# Patient Record
Sex: MALE | Race: WHITE | NOT HISPANIC OR LATINO | URBAN - METROPOLITAN AREA
[De-identification: names, ages, dates, MRNs, and addresses within clinical notes are randomized per-mention and may not be internally consistent; named-entity substitution may affect disease eponyms.]

---

## 2017-05-11 ENCOUNTER — EMERGENCY (EMERGENCY)
Facility: HOSPITAL | Age: 57
LOS: 0 days | Discharge: ANOTHER HOSPITAL | End: 2017-05-11
Attending: EMERGENCY MEDICINE | Admitting: EMERGENCY MEDICINE
Payer: OTHER MISCELLANEOUS

## 2017-05-11 VITALS
TEMPERATURE: 98 F | HEART RATE: 90 BPM | RESPIRATION RATE: 18 BRPM | SYSTOLIC BLOOD PRESSURE: 162 MMHG | OXYGEN SATURATION: 97 % | DIASTOLIC BLOOD PRESSURE: 98 MMHG

## 2017-05-11 VITALS
HEART RATE: 76 BPM | TEMPERATURE: 98 F | OXYGEN SATURATION: 100 % | DIASTOLIC BLOOD PRESSURE: 100 MMHG | RESPIRATION RATE: 17 BRPM | SYSTOLIC BLOOD PRESSURE: 168 MMHG
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
## 2017-05-11 DIAGNOSIS — T23.061A BURN OF UNSPECIFIED DEGREE OF BACK OF RIGHT HAND, INITIAL ENCOUNTER: ICD-10-CM

## 2017-05-11 DIAGNOSIS — T23.062A BURN OF UNSPECIFIED DEGREE OF BACK OF LEFT HAND, INITIAL ENCOUNTER: ICD-10-CM

## 2017-05-11 DIAGNOSIS — T23.069A: ICD-10-CM
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
## 2017-05-11 PROCEDURE — 99284 EMERGENCY DEPT VISIT MOD MDM: CPT

## 2017-05-11 RX ORDER — MORPHINE SULFATE 50 MG/1
4 CAPSULE, EXTENDED RELEASE ORAL ONCE
Qty: 0 | Refills: 0 | Status: DISCONTINUED | OUTPATIENT
Start: 2017-05-11 | End: 2017-05-11

## 2017-05-11 RX ORDER — SODIUM CHLORIDE 9 MG/ML
1000 INJECTION INTRAMUSCULAR; INTRAVENOUS; SUBCUTANEOUS ONCE
Qty: 0 | Refills: 0 | Status: COMPLETED | OUTPATIENT
Start: 2017-05-11 | End: 2017-05-11

## 2017-05-11 RX ADMIN — SODIUM CHLORIDE 1000 MILLILITER(S): 9 INJECTION INTRAMUSCULAR; INTRAVENOUS; SUBCUTANEOUS at 16:02

## 2017-05-11 RX ADMIN — MORPHINE SULFATE 4 MILLIGRAM(S): 50 CAPSULE, EXTENDED RELEASE ORAL at 16:12

## 2017-05-11 RX ADMIN — MORPHINE SULFATE 4 MILLIGRAM(S): 50 CAPSULE, EXTENDED RELEASE ORAL at 16:30

## 2017-05-11 NOTE — ED PROVIDER NOTE - NS ED MD SCRIBE ATTENDING SCRIBE SECTIONS
RESULTS/DISPOSITION/HISTORY OF PRESENT ILLNESS/PROGRESS NOTE/PHYSICAL EXAM/VITAL SIGNS( Pullset)/PAST MEDICAL/SURGICAL/SOCIAL HISTORY/REVIEW OF SYSTEMS
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
## 2017-05-11 NOTE — ED PROVIDER NOTE - PROGRESS NOTE DETAILS
Deon Mai, on behalf of Attending Dr. Lopes, discussed case with Dr. Lala at [facility] who accepted transfer of patient to [facility] for further burn evaluation. Spoke with Dr. Filippo Lala MD. Patient's transfer is accepted to ER. I appreciate [facility] accepting this patient's transfer for burn evaluation. Patient is amicable for transfer. Pain is managed. Spoke with Dr. Filippo Lala MD. Patient's transfer is accepted to ER. I appreciate [facility] accepting this patient's transfer for burn evaluation. Patient is amicable for transfer. Pain is managed. Transfer consent signed.

## 2017-05-11 NOTE — ED PROVIDER NOTE - OBJECTIVE STATEMENT
58 y/o male with no PMHx presents to the ED c/o burns and pain to B/L hands x few hours. 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 Denies N/V/D, fever, chills, numbness or tingling No other c/o at this time. Current smoker. No alcohol or drug use. 58 y/o male with no PMHx presents to the ED c/o burns and pain to B/L hands x few hours. 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 Denies N/V/D, fever, chills, numbness or tingling No other c/o at this time. Current smoker. No alcohol or drug use. No visual problems. No focal neurological deficits. No trauma. 58 y/o male with no known PMHx presents to the ED c/o burns and pain to B/L hands x few hours. 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 Denies N/V/D, fever, chills, numbness or tingling No other c/o at this time. Current smoker. No alcohol or drug use. No visual problems. No focal neurological deficits. No trauma. Pain is burning in nature, moderate, non radiating, and present on the dorsum of both hands on the areas of the burns.

## 2017-05-11 NOTE — ED PROVIDER NOTE - DETAILS:
I Graeme Lopes MD, saw and examined this patient on my own. Scribe documented under my supervision and for me and I have modified the scribe's note where necessary to reflect my history and physical exam findings. I agree with documentation as it appears now. I Graeme Lopes MD saw and examined this patient on my own. Scribe documented for me and under my supervision. I have modified the scribe's documentation where necessary to reflect my history, physical exam findings, and any other relevant information pertaining to patient's care.
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
## 2018-12-13 NOTE — ED PROVIDER NOTE - DURATION
Pt is calling in regards to paperwork that Dr. Anand Russell is supposed to sign. Pt wanted to know if it was sign as of yet. Phone# [phone]  Thanks. hour(s)

## 2019-01-25 NOTE — ED ADULT NURSE NOTE - CAS TRG GENERAL NORM CIRC DET
Detail Level: Detailed Quality 110: Preventive Care And Screening: Influenza Immunization: Influenza Immunization not Administered because Patient Refused. Strong peripheral pulses

## 2019-01-30 NOTE — ED PROVIDER NOTE - NEUROLOGICAL, MLM
Detail Level: Detailed Detail Level: Simple Alert and oriented, no focal deficits, no motor or sensory deficits. FROM of upper extremities. Sensation intact.

## 2023-07-21 NOTE — ED PROVIDER NOTE - EYES, MLM
Patient does not have any ACP documents/Medical Power of . LSW notes hospital will follow [place]'s Next of Kin hierarchy in the following descending order for priority:    Guardian  Spouse  Majority of adult Children  Parents  Majority of adult Siblings  Nearest Relative not described above    Per [place]'s Next of Kin hierarchy: Patients spouse will be Primary Healthcare Decision Maker. Clear bilaterally, pupils equal, round and reactive to light.